# Patient Record
Sex: MALE | Race: WHITE | NOT HISPANIC OR LATINO | URBAN - METROPOLITAN AREA
[De-identification: names, ages, dates, MRNs, and addresses within clinical notes are randomized per-mention and may not be internally consistent; named-entity substitution may affect disease eponyms.]

---

## 2019-09-12 ENCOUNTER — INPATIENT (INPATIENT)
Facility: HOSPITAL | Age: 34
LOS: 1 days | Discharge: HOME | End: 2019-09-14
Attending: INTERNAL MEDICINE | Admitting: INTERNAL MEDICINE
Payer: COMMERCIAL

## 2019-09-12 VITALS
HEART RATE: 92 BPM | OXYGEN SATURATION: 100 % | RESPIRATION RATE: 20 BRPM | DIASTOLIC BLOOD PRESSURE: 110 MMHG | SYSTOLIC BLOOD PRESSURE: 155 MMHG

## 2019-09-12 LAB
ALBUMIN SERPL ELPH-MCNC: 4.2 G/DL — SIGNIFICANT CHANGE UP (ref 3.5–5.2)
ALBUMIN SERPL ELPH-MCNC: 4.4 G/DL — SIGNIFICANT CHANGE UP (ref 3.5–5.2)
ALP SERPL-CCNC: 128 U/L — HIGH (ref 30–115)
ALP SERPL-CCNC: 136 U/L — HIGH (ref 30–115)
ALT FLD-CCNC: 50 U/L — HIGH (ref 0–41)
ALT FLD-CCNC: 57 U/L — HIGH (ref 0–41)
ANION GAP SERPL CALC-SCNC: 13 MMOL/L — SIGNIFICANT CHANGE UP (ref 7–14)
ANION GAP SERPL CALC-SCNC: 15 MMOL/L — HIGH (ref 7–14)
AST SERPL-CCNC: 54 U/L — HIGH (ref 0–41)
AST SERPL-CCNC: 73 U/L — HIGH (ref 0–41)
BASOPHILS # BLD AUTO: 0.07 K/UL — SIGNIFICANT CHANGE UP (ref 0–0.2)
BASOPHILS NFR BLD AUTO: 0.9 % — SIGNIFICANT CHANGE UP (ref 0–1)
BILIRUB DIRECT SERPL-MCNC: <0.2 MG/DL — SIGNIFICANT CHANGE UP (ref 0–0.2)
BILIRUB INDIRECT FLD-MCNC: >0.4 MG/DL — SIGNIFICANT CHANGE UP (ref 0.2–1.2)
BILIRUB SERPL-MCNC: 0.6 MG/DL — SIGNIFICANT CHANGE UP (ref 0.2–1.2)
BILIRUB SERPL-MCNC: 0.9 MG/DL — SIGNIFICANT CHANGE UP (ref 0.2–1.2)
BLD GP AB SCN SERPL QL: SIGNIFICANT CHANGE UP
BUN SERPL-MCNC: 20 MG/DL — SIGNIFICANT CHANGE UP (ref 10–20)
BUN SERPL-MCNC: 21 MG/DL — HIGH (ref 10–20)
CALCIUM SERPL-MCNC: 8.2 MG/DL — LOW (ref 8.5–10.1)
CALCIUM SERPL-MCNC: 8.8 MG/DL — SIGNIFICANT CHANGE UP (ref 8.5–10.1)
CHLORIDE SERPL-SCNC: 101 MMOL/L — SIGNIFICANT CHANGE UP (ref 98–110)
CHLORIDE SERPL-SCNC: 104 MMOL/L — SIGNIFICANT CHANGE UP (ref 98–110)
CO2 SERPL-SCNC: 20 MMOL/L — SIGNIFICANT CHANGE UP (ref 17–32)
CO2 SERPL-SCNC: 25 MMOL/L — SIGNIFICANT CHANGE UP (ref 17–32)
CREAT SERPL-MCNC: 1.1 MG/DL — SIGNIFICANT CHANGE UP (ref 0.7–1.5)
CREAT SERPL-MCNC: 1.2 MG/DL — SIGNIFICANT CHANGE UP (ref 0.7–1.5)
EOSINOPHIL # BLD AUTO: 0.09 K/UL — SIGNIFICANT CHANGE UP (ref 0–0.7)
EOSINOPHIL NFR BLD AUTO: 1.2 % — SIGNIFICANT CHANGE UP (ref 0–8)
GLUCOSE SERPL-MCNC: 153 MG/DL — HIGH (ref 70–99)
GLUCOSE SERPL-MCNC: 98 MG/DL — SIGNIFICANT CHANGE UP (ref 70–99)
HCT VFR BLD CALC: 39 % — LOW (ref 42–52)
HGB BLD-MCNC: 13 G/DL — LOW (ref 14–18)
IMM GRANULOCYTES NFR BLD AUTO: 0.7 % — HIGH (ref 0.1–0.3)
LYMPHOCYTES # BLD AUTO: 1.5 K/UL — SIGNIFICANT CHANGE UP (ref 1.2–3.4)
LYMPHOCYTES # BLD AUTO: 20.3 % — LOW (ref 20.5–51.1)
MAGNESIUM SERPL-MCNC: 0.8 MG/DL — LOW (ref 1.8–2.4)
MAGNESIUM SERPL-MCNC: 1.7 MG/DL — LOW (ref 1.8–2.4)
MCHC RBC-ENTMCNC: 31.6 PG — HIGH (ref 27–31)
MCHC RBC-ENTMCNC: 33.3 G/DL — SIGNIFICANT CHANGE UP (ref 32–37)
MCV RBC AUTO: 94.9 FL — HIGH (ref 80–94)
MONOCYTES # BLD AUTO: 0.91 K/UL — HIGH (ref 0.1–0.6)
MONOCYTES NFR BLD AUTO: 12.3 % — HIGH (ref 1.7–9.3)
NEUTROPHILS # BLD AUTO: 4.78 K/UL — SIGNIFICANT CHANGE UP (ref 1.4–6.5)
NEUTROPHILS NFR BLD AUTO: 64.6 % — SIGNIFICANT CHANGE UP (ref 42.2–75.2)
NRBC # BLD: 0 /100 WBCS — SIGNIFICANT CHANGE UP (ref 0–0)
PHOSPHATE SERPL-MCNC: 4.6 MG/DL — SIGNIFICANT CHANGE UP (ref 2.1–4.9)
PLATELET # BLD AUTO: 315 K/UL — SIGNIFICANT CHANGE UP (ref 130–400)
POTASSIUM SERPL-MCNC: 4 MMOL/L — SIGNIFICANT CHANGE UP (ref 3.5–5)
POTASSIUM SERPL-MCNC: 4.7 MMOL/L — SIGNIFICANT CHANGE UP (ref 3.5–5)
POTASSIUM SERPL-SCNC: 4 MMOL/L — SIGNIFICANT CHANGE UP (ref 3.5–5)
POTASSIUM SERPL-SCNC: 4.7 MMOL/L — SIGNIFICANT CHANGE UP (ref 3.5–5)
PROT SERPL-MCNC: 6.9 G/DL — SIGNIFICANT CHANGE UP (ref 6–8)
PROT SERPL-MCNC: 6.9 G/DL — SIGNIFICANT CHANGE UP (ref 6–8)
RBC # BLD: 4.11 M/UL — LOW (ref 4.7–6.1)
RBC # FLD: 13.7 % — SIGNIFICANT CHANGE UP (ref 11.5–14.5)
SODIUM SERPL-SCNC: 139 MMOL/L — SIGNIFICANT CHANGE UP (ref 135–146)
SODIUM SERPL-SCNC: 139 MMOL/L — SIGNIFICANT CHANGE UP (ref 135–146)
WBC # BLD: 7.4 K/UL — SIGNIFICANT CHANGE UP (ref 4.8–10.8)
WBC # FLD AUTO: 7.4 K/UL — SIGNIFICANT CHANGE UP (ref 4.8–10.8)

## 2019-09-12 PROCEDURE — 99223 1ST HOSP IP/OBS HIGH 75: CPT

## 2019-09-12 PROCEDURE — 99285 EMERGENCY DEPT VISIT HI MDM: CPT

## 2019-09-12 PROCEDURE — 71045 X-RAY EXAM CHEST 1 VIEW: CPT | Mod: 26

## 2019-09-12 RX ORDER — MAGNESIUM SULFATE 500 MG/ML
2 VIAL (ML) INJECTION ONCE
Refills: 0 | Status: COMPLETED | OUTPATIENT
Start: 2019-09-12 | End: 2019-09-12

## 2019-09-12 RX ORDER — TRANEXAMIC ACID 100 MG/ML
1300 INJECTION, SOLUTION INTRAVENOUS ONCE
Refills: 0 | Status: DISCONTINUED | OUTPATIENT
Start: 2019-09-12 | End: 2019-09-12

## 2019-09-12 RX ORDER — SODIUM CHLORIDE 9 MG/ML
1000 INJECTION INTRAMUSCULAR; INTRAVENOUS; SUBCUTANEOUS ONCE
Refills: 0 | Status: COMPLETED | OUTPATIENT
Start: 2019-09-12 | End: 2019-09-12

## 2019-09-12 RX ORDER — MAGNESIUM SULFATE 500 MG/ML
4 VIAL (ML) INJECTION ONCE
Refills: 0 | Status: DISCONTINUED | OUTPATIENT
Start: 2019-09-12 | End: 2019-09-12

## 2019-09-12 RX ORDER — EPINEPHRINE 0.3 MG/.3ML
0.3 INJECTION INTRAMUSCULAR; SUBCUTANEOUS ONCE
Refills: 0 | Status: COMPLETED | OUTPATIENT
Start: 2019-09-12 | End: 2019-09-12

## 2019-09-12 RX ORDER — DIPHENHYDRAMINE HCL 50 MG
25 CAPSULE ORAL EVERY 6 HOURS
Refills: 0 | Status: DISCONTINUED | OUTPATIENT
Start: 2019-09-12 | End: 2019-09-14

## 2019-09-12 RX ORDER — TRANEXAMIC ACID 100 MG/ML
1000 INJECTION, SOLUTION INTRAVENOUS ONCE
Refills: 0 | Status: COMPLETED | OUTPATIENT
Start: 2019-09-12 | End: 2019-09-12

## 2019-09-12 RX ORDER — FAMOTIDINE 10 MG/ML
20 INJECTION INTRAVENOUS
Refills: 0 | Status: DISCONTINUED | OUTPATIENT
Start: 2019-09-12 | End: 2019-09-14

## 2019-09-12 RX ORDER — FAMOTIDINE 10 MG/ML
20 INJECTION INTRAVENOUS
Refills: 0 | Status: DISCONTINUED | OUTPATIENT
Start: 2019-09-12 | End: 2019-09-12

## 2019-09-12 RX ORDER — FAMOTIDINE 10 MG/ML
20 INJECTION INTRAVENOUS ONCE
Refills: 0 | Status: COMPLETED | OUTPATIENT
Start: 2019-09-12 | End: 2019-09-12

## 2019-09-12 RX ORDER — FOLIC ACID 0.8 MG
1 TABLET ORAL DAILY
Refills: 0 | Status: DISCONTINUED | OUTPATIENT
Start: 2019-09-12 | End: 2019-09-14

## 2019-09-12 RX ORDER — ENOXAPARIN SODIUM 100 MG/ML
40 INJECTION SUBCUTANEOUS DAILY
Refills: 0 | Status: DISCONTINUED | OUTPATIENT
Start: 2019-09-12 | End: 2019-09-14

## 2019-09-12 RX ORDER — SODIUM CHLORIDE 9 MG/ML
1000 INJECTION, SOLUTION INTRAVENOUS
Refills: 0 | Status: DISCONTINUED | OUTPATIENT
Start: 2019-09-12 | End: 2019-09-13

## 2019-09-12 RX ORDER — DIPHENHYDRAMINE HCL 50 MG
50 CAPSULE ORAL ONCE
Refills: 0 | Status: COMPLETED | OUTPATIENT
Start: 2019-09-12 | End: 2019-09-12

## 2019-09-12 RX ORDER — THIAMINE MONONITRATE (VIT B1) 100 MG
100 TABLET ORAL DAILY
Refills: 0 | Status: DISCONTINUED | OUTPATIENT
Start: 2019-09-12 | End: 2019-09-14

## 2019-09-12 RX ADMIN — TRANEXAMIC ACID 220 MILLIGRAM(S): 100 INJECTION, SOLUTION INTRAVENOUS at 06:43

## 2019-09-12 RX ADMIN — Medication 50 MILLIGRAM(S): at 06:00

## 2019-09-12 RX ADMIN — Medication 50 GRAM(S): at 08:07

## 2019-09-12 RX ADMIN — FAMOTIDINE 20 MILLIGRAM(S): 10 INJECTION INTRAVENOUS at 17:59

## 2019-09-12 RX ADMIN — EPINEPHRINE 0.3 MILLIGRAM(S): 0.3 INJECTION INTRAMUSCULAR; SUBCUTANEOUS at 06:00

## 2019-09-12 RX ADMIN — FAMOTIDINE 20 MILLIGRAM(S): 10 INJECTION INTRAVENOUS at 06:00

## 2019-09-12 RX ADMIN — SODIUM CHLORIDE 2000 MILLILITER(S): 9 INJECTION INTRAMUSCULAR; INTRAVENOUS; SUBCUTANEOUS at 06:00

## 2019-09-12 RX ADMIN — SODIUM CHLORIDE 125 MILLILITER(S): 9 INJECTION, SOLUTION INTRAVENOUS at 16:52

## 2019-09-12 RX ADMIN — Medication 2 MILLIGRAM(S): at 15:20

## 2019-09-12 RX ADMIN — Medication 25 MILLIGRAM(S): at 17:59

## 2019-09-12 RX ADMIN — Medication 50 GRAM(S): at 09:03

## 2019-09-12 RX ADMIN — SODIUM CHLORIDE 125 MILLILITER(S): 9 INJECTION, SOLUTION INTRAVENOUS at 22:43

## 2019-09-12 RX ADMIN — Medication 125 MILLIGRAM(S): at 06:00

## 2019-09-12 NOTE — ED PROVIDER NOTE - OBJECTIVE STATEMENT
34 y.o M w/ PMHx HTN on lisinopril p/w swelling of his mouth and lips that began overnight. Pt has no known allergies, and no hx of reaction or similar. Pt endorses a change in his voice. No family history of similar. Pt denies CP, no SOB, no N/V, no rash, no dizziness, no numbness or tingling.

## 2019-09-12 NOTE — H&P ADULT - NSHPREVIEWOFSYSTEMS_GEN_ALL_CORE
REVIEW OF SYSTEMS:    CONSTITUTIONAL: No weakness, fevers or chills  EYES/ENT: No visual changes;  "scratchy" sensation in throat has improved since last night   NECK: No pain or stiffness  RESPIRATORY: No cough, wheezing, hemoptysis; +shortness of breath this morning but resolved after treatment  CARDIOVASCULAR: No chest pain or palpitations  GASTROINTESTINAL: No abdominal or epigastric pain. No nausea, vomiting, or hematemesis; No diarrhea or constipation. No melena or hematochezia.  GENITOURINARY: No dysuria, frequency or hematuria  NEUROLOGICAL: No numbness or weakness

## 2019-09-12 NOTE — ED PROVIDER NOTE - PHYSICAL EXAMINATION
CONSTITUTIONAL: well-appearing, in NAD  SKIN: Warm dry, normal skin turgor, no rash.  HEAD: angioedema or mouth and lips.  EYES: PERRLA, no scleral icterus, conjunctiva pink  ENT: no erythema or exudates, edema of uvula, no swelling of mouth. Airway patent.   NECK: Supple; non tender. Full ROM.  CARD: RRR, no murmurs.  RESP: clear to ausculation b/l. No crackles or wheezing.  ABD: soft, non-tender, non-distended, no rebound or guarding.  EXT: no pedal edema, no calf tenderness  NEURO: normal motor. normal sensory. Normal gait.  PSYCH: Cooperative, appropriate.

## 2019-09-12 NOTE — H&P ADULT - HISTORY OF PRESENT ILLNESS
35 yo M with PMH of bladder cancer (treated, diagnosed at 3 year old), HTN on lisinopril, ADD     presents with swelling of his lips. First noticed at 10pm last night, first noticed and felt L side of upper lip was swollen, which then spread to both lips, but upper lip was more swollen.  Around 1am he felt "scratchy" sensation in the back of his throat.  By 5am was having difficulty breathing and then came to hospital.      In ER: initial vitals: T 98.2, HR92, /110, RR 20, pO2 100.  Was given epinephrine injection, IV solumedrol, benadryl, pepcid.  Magnesium was low, given supplementation.      In CCU during exam pt sitting up, no respiratory distress, speaking in full sentences.  Reports itching sensation in back of throat has improved, as well as swelling in lips.  L side of upper lip more swollen than right side. Pt denies headache, chest pain, SOB, nausea, rash, dizziness, numbness.    Pt denies any history of allergies or any similar previous reaction. No family history of allergies or similar reactions.   He started working on [employer] three months ago.  Does not recall eating any foods he has not tried before.  Has never had reaction, to nuts/seafood/any other food in past.  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

## 2019-09-12 NOTE — H&P ADULT - NSHPLABSRESULTS_GEN_ALL_CORE
Labs:                        13.0   7.40  )-----------( 315      ( 12 Sep 2019 06:40 )             39.0             09-12    139  |  101  |  21<H>  ----------------------------<  98  4.0   |  25  |  1.2    Ca    8.8      12 Sep 2019 06:40  Mg     0.8     09-12    TPro  6.9  /  Alb  4.4  /  TBili  0.9  /  DBili  x   /  AST  73<H>  /  ALT  57<H>  /  AlkPhos  136<H>  09-12    LIVER FUNCTIONS - ( 12 Sep 2019 06:40 )  Alb: 4.4 g/dL / Pro: 6.9 g/dL / ALK PHOS: 136 U/L / ALT: 57 U/L / AST: 73 U/L / GGT: x

## 2019-09-12 NOTE — ED PROVIDER NOTE - CLINICAL SUMMARY MEDICAL DECISION MAKING FREE TEXT BOX
Pt  pw  angioedema  lips  and pharynx  starting at 2am -  progressively worsening - no rash - no new foods,   on Lisinopril for years .'in ED   -  steroids  benadryl and epi ,  TXA 1 gm    4gm MG given  for hypomag - pt  with improvement of symptoms-  admitted to  ICU for continued observation

## 2019-09-12 NOTE — ED ADULT NURSE NOTE - NSIMPLEMENTINTERV_GEN_ALL_ED
Implemented All Universal Safety Interventions:  [place] to call system. Call bell, personal items and telephone within reach. Instruct patient to call for assistance. Room bathroom lighting operational. Non-slip footwear when patient is off stretcher. Physically safe environment: no spills, clutter or unnecessary equipment. Stretcher in lowest position, wheels locked, appropriate side rails in place.

## 2019-09-12 NOTE — H&P ADULT - NSHPSOCIALHISTORY_GEN_ALL_CORE
drinks 1 bottle of vodka most days, did not drink last 2 days because [place] sea  no smoking  denies illicit drug use    works on [employer], exposed to gasoline fumes

## 2019-09-12 NOTE — H&P ADULT - ASSESSMENT
35 yo M with PMH of bladder cancer (treated, diagnosed at 3 year old), HTN on lisinopril, ADD     lip and facial swelling - started last night, itching sensation started 3am today  difficulty breathing started at 5am  vitals all stable in ER    #angioedema   -likely secondary to ace-inhibitor use  -given epinephrine injection, IV solumedrol, benadryl, pepcid in ER  -continue IV steroids, benadryl, pepcid  -symptoms, swelling decreasing since this morning, will continue to monitor in CCU  -npo for now, will start IV fluids  -advised to stop taking ace-inhibitor    #alcohol intake  -drinks 1L vodka most days  -has not had drink in last 2 days due to being [place] sea  -feeling slightly agitated, will give IV 2mg ativan  -CORONAWA protocol  -thiamine and folic acid       npo for now  dvt ppx  full code

## 2019-09-12 NOTE — H&P ADULT - NSHPPHYSICALEXAM_GEN_ALL_CORE
PHYSICAL EXAM:  GENERAL: NAD, speaks in full sentences, no signs of respiratory distress  HEENT: angioedema: lips left > right, upper > lower lip.  Mild swelling of uvula, not deviated. EOMI, PERRLA, conjunctiva and sclera clear; Supple, No JVD.    CHEST/LUNG: Clear to auscultation bilaterally; No wheeze; No crackles; No accessory muscles used  HEART: Regular rate and rhythm; No murmurs;   ABDOMEN: Soft, Nontender, Nondistended; Bowel sounds present; No guarding.  Scar from bladder surgery.    EXTREMITIES:  2+ Peripheral Pulses, No cyanosis or edema  PSYCH: AAOx3  NEUROLOGY: non-focal

## 2019-09-13 LAB
ALBUMIN SERPL ELPH-MCNC: 4 G/DL — SIGNIFICANT CHANGE UP (ref 3.5–5.2)
ALP SERPL-CCNC: 118 U/L — HIGH (ref 30–115)
ALT FLD-CCNC: 40 U/L — SIGNIFICANT CHANGE UP (ref 0–41)
ANION GAP SERPL CALC-SCNC: 13 MMOL/L — SIGNIFICANT CHANGE UP (ref 7–14)
ANION GAP SERPL CALC-SCNC: 14 MMOL/L — SIGNIFICANT CHANGE UP (ref 7–14)
AST SERPL-CCNC: 39 U/L — SIGNIFICANT CHANGE UP (ref 0–41)
BILIRUB SERPL-MCNC: 0.5 MG/DL — SIGNIFICANT CHANGE UP (ref 0.2–1.2)
BUN SERPL-MCNC: 20 MG/DL — SIGNIFICANT CHANGE UP (ref 10–20)
BUN SERPL-MCNC: 21 MG/DL — HIGH (ref 10–20)
CALCIUM SERPL-MCNC: 8 MG/DL — LOW (ref 8.5–10.1)
CALCIUM SERPL-MCNC: 8.2 MG/DL — LOW (ref 8.5–10.1)
CHLORIDE SERPL-SCNC: 104 MMOL/L — SIGNIFICANT CHANGE UP (ref 98–110)
CHLORIDE SERPL-SCNC: 106 MMOL/L — SIGNIFICANT CHANGE UP (ref 98–110)
CO2 SERPL-SCNC: 25 MMOL/L — SIGNIFICANT CHANGE UP (ref 17–32)
CO2 SERPL-SCNC: 27 MMOL/L — SIGNIFICANT CHANGE UP (ref 17–32)
CREAT SERPL-MCNC: 1 MG/DL — SIGNIFICANT CHANGE UP (ref 0.7–1.5)
CREAT SERPL-MCNC: 1.1 MG/DL — SIGNIFICANT CHANGE UP (ref 0.7–1.5)
GLUCOSE SERPL-MCNC: 110 MG/DL — HIGH (ref 70–99)
GLUCOSE SERPL-MCNC: 110 MG/DL — HIGH (ref 70–99)
HCT VFR BLD CALC: 37.8 % — LOW (ref 42–52)
HGB BLD-MCNC: 12.6 G/DL — LOW (ref 14–18)
MAGNESIUM SERPL-MCNC: 1.6 MG/DL — LOW (ref 1.8–2.4)
MCHC RBC-ENTMCNC: 31.6 PG — HIGH (ref 27–31)
MCHC RBC-ENTMCNC: 33.3 G/DL — SIGNIFICANT CHANGE UP (ref 32–37)
MCV RBC AUTO: 94.7 FL — HIGH (ref 80–94)
NRBC # BLD: 0 /100 WBCS — SIGNIFICANT CHANGE UP (ref 0–0)
PHOSPHATE SERPL-MCNC: 2.9 MG/DL — SIGNIFICANT CHANGE UP (ref 2.1–4.9)
PLATELET # BLD AUTO: 290 K/UL — SIGNIFICANT CHANGE UP (ref 130–400)
POTASSIUM SERPL-MCNC: 3.8 MMOL/L — SIGNIFICANT CHANGE UP (ref 3.5–5)
POTASSIUM SERPL-MCNC: 4.6 MMOL/L — SIGNIFICANT CHANGE UP (ref 3.5–5)
POTASSIUM SERPL-SCNC: 3.8 MMOL/L — SIGNIFICANT CHANGE UP (ref 3.5–5)
POTASSIUM SERPL-SCNC: 4.6 MMOL/L — SIGNIFICANT CHANGE UP (ref 3.5–5)
PROT SERPL-MCNC: 6.5 G/DL — SIGNIFICANT CHANGE UP (ref 6–8)
RBC # BLD: 3.99 M/UL — LOW (ref 4.7–6.1)
RBC # FLD: 13.4 % — SIGNIFICANT CHANGE UP (ref 11.5–14.5)
SODIUM SERPL-SCNC: 142 MMOL/L — SIGNIFICANT CHANGE UP (ref 135–146)
SODIUM SERPL-SCNC: 147 MMOL/L — HIGH (ref 135–146)
WBC # BLD: 10 K/UL — SIGNIFICANT CHANGE UP (ref 4.8–10.8)
WBC # FLD AUTO: 10 K/UL — SIGNIFICANT CHANGE UP (ref 4.8–10.8)

## 2019-09-13 PROCEDURE — 99233 SBSQ HOSP IP/OBS HIGH 50: CPT

## 2019-09-13 RX ORDER — METOPROLOL TARTRATE 50 MG
25 TABLET ORAL
Refills: 0 | Status: DISCONTINUED | OUTPATIENT
Start: 2019-09-13 | End: 2019-09-14

## 2019-09-13 RX ORDER — AMLODIPINE BESYLATE 2.5 MG/1
5 TABLET ORAL DAILY
Refills: 0 | Status: DISCONTINUED | OUTPATIENT
Start: 2019-09-13 | End: 2019-09-14

## 2019-09-13 RX ORDER — MAGNESIUM SULFATE 500 MG/ML
2 VIAL (ML) INJECTION ONCE
Refills: 0 | Status: COMPLETED | OUTPATIENT
Start: 2019-09-13 | End: 2019-09-13

## 2019-09-13 RX ADMIN — Medication 2 MILLIGRAM(S): at 22:48

## 2019-09-13 RX ADMIN — Medication 1 MILLIGRAM(S): at 14:19

## 2019-09-13 RX ADMIN — AMLODIPINE BESYLATE 5 MILLIGRAM(S): 2.5 TABLET ORAL at 12:04

## 2019-09-13 RX ADMIN — Medication 25 MILLIGRAM(S): at 00:51

## 2019-09-13 RX ADMIN — Medication 100 MILLIGRAM(S): at 11:14

## 2019-09-13 RX ADMIN — Medication 50 GRAM(S): at 14:20

## 2019-09-13 RX ADMIN — ENOXAPARIN SODIUM 40 MILLIGRAM(S): 100 INJECTION SUBCUTANEOUS at 11:13

## 2019-09-13 RX ADMIN — Medication 25 MILLIGRAM(S): at 05:56

## 2019-09-13 RX ADMIN — Medication 25 MILLIGRAM(S): at 17:41

## 2019-09-13 RX ADMIN — Medication 60 MILLIGRAM(S): at 05:55

## 2019-09-13 RX ADMIN — FAMOTIDINE 20 MILLIGRAM(S): 10 INJECTION INTRAVENOUS at 17:41

## 2019-09-13 RX ADMIN — FAMOTIDINE 20 MILLIGRAM(S): 10 INJECTION INTRAVENOUS at 05:55

## 2019-09-13 RX ADMIN — Medication 25 MILLIGRAM(S): at 22:22

## 2019-09-13 RX ADMIN — Medication 25 MILLIGRAM(S): at 11:14

## 2019-09-13 NOTE — CHART NOTE - NSCHARTNOTEFT_GEN_A_CORE
PT with elevated BP (SBPs > 180). On Amlodipine. No symptoms. Hx of angioedema. Will hold starting an ACE-I. Start on Metoprolol succinate 25 mg BID

## 2019-09-13 NOTE — CHART NOTE - NSCHARTNOTEFT_GEN_A_CORE
Patient seen at bedside - pleasant, resting comfortably.    Pushed IV benadryl. Patient states he is feeling better - w/o complaints.     Patient without questions or concerns at this time.     Patient encouraged to contact PA with any further questions or concerns.

## 2019-09-13 NOTE — PROGRESS NOTE ADULT - ASSESSMENT
33 yo M with PMH of bladder cancer (treated, diagnosed at 3 year old), HTN on lisinopril, ADD     lip and facial swelling associated with difficulty breathing       #angioedema   -likely secondary to ace-inhibitor use  -given epinephrine injection, IV solumedrol, benadryl, pepcid in ER  -continue IV steroids, benadryl, pepcid  -symptoms, swelling decreasing since this morning, can downgrade to medical floor  -start DASH diet  -advised to stop taking ace-inhibitor    #HTN  -will start amlodipine for HTN    #alcohol intake  -drinks 1L vodka most days  -has not had drink in last 2 days due to [place]  -[facility] protocol  -thiamine and folic acid       DASH diet    dvt ppx  full code

## 2019-09-13 NOTE — CONSULT NOTE ADULT - SUBJECTIVE AND OBJECTIVE BOX
Patient is a 34y old  Male who presents with a chief complaint of lip/facial swelling (12 Sep 2019 13:10)      HPI:  33 yo M with PMH of bladder cancer (treated, diagnosed at 3 year old), HTN on lisinopril, ADD     presents with swelling of his lips. First noticed at 10pm last night, first noticed and felt L side of upper lip was swollen, which then spread to both lips, but upper lip was more swollen.  Around 1am he felt "scratchy" sensation in the back of his throat.  By 5am was having difficulty breathing and then came to hospital.      In ER: initial vitals: T 98.2, HR92, /110, RR 20, pO2 100.  Was given epinephrine injection, IV solumedrol, benadryl, pepcid.  Magnesium was low, given supplementation.      In CCU during exam pt sitting up, no respiratory distress, speaking in full sentences.  Reports itching sensation in back of throat has improved, as well as swelling in lips.  L side of upper lip more swollen than right side. Pt denies headache, chest pain, SOB, nausea, rash, dizziness, numbness.    Pt denies any history of allergies or any similar previous reaction. No family history of allergies or similar reactions.   He started working on [employer] three months ago.  Does not recall eating any foods he has not tried before.  Has never had reaction, to nuts/seafood/any other food in past.  [employer] gasoline and is exposed to fumes. (12 Sep 2019 13:10)  today feel much better not on distress swelling decrease     PAST MEDICAL & SURGICAL HISTORY:  Bladder cancer: diagnosed, treated at 3 years old  Hypertension  No significant past surgical history    Allergies    No Known Allergies    Intolerances      Family history : no cardiovscular family history   Home Medications:  Adderall:  (12 Sep 2019 13:25)  lisinopril:  (12 Sep 2019 13:25)    Occupation:  Alochol: Denied  Smoking: Denied  Drug Use: Denied  Marital Status:         ROS: as in HPI; All other systems reviewed are negative    ICU Vital Signs Last 24 Hrs  T(C): 36.7 (13 Sep 2019 07:10), Max: 36.8 (12 Sep 2019 11:06)  T(F): 98.1 (13 Sep 2019 07:10), Max: 98.3 (12 Sep 2019 11:06)  HR: 77 (13 Sep 2019 07:31) (57 - 81)  BP: 160/105 (13 Sep 2019 07:31) (127/84 - 175/110)  BP(mean): 127 (13 Sep 2019 07:31) (101 - 127)  ABP: --  ABP(mean): --  RR: 20 (13 Sep 2019 07:10) (18 - 22)  SpO2: 100% (13 Sep 2019 07:31) (97% - 100%)        Physical Examination:    General: No acute distress.  Alert, oriented, interactive, nonfocal    HEENT: swelling lips mild, mild cheek swelling left     PULM: Clear to auscultation bilaterally, no significant sputum production    CVS: Regular rate and rhythm, no murmurs, rubs, or gallops    ABD: Soft, nondistended, nontender, normoactive bowel sounds, no masses    EXT: No edema, nontender, no clubbing     SKIN: Warm and well perfused, no rashes noted.    Neurology : no motor or sensory deficit     Musculoskeletal : move all extremity     Lymphatic system: no Palpable node               I&O's Detail    12 Sep 2019 07:01  -  13 Sep 2019 07:00  --------------------------------------------------------  IN:    dextrose 5% + sodium chloride 0.9%.: 2125 mL    Oral Fluid: 100 mL  Total IN: 2225 mL    OUT:    Voided: 1500 mL  Total OUT: 1500 mL    Total NET: 725 mL      13 Sep 2019 07:01  -  13 Sep 2019 09:44  --------------------------------------------------------  IN:    dextrose 5% + sodium chloride 0.9%.: 500 mL  Total IN: 500 mL    OUT:  Total OUT: 0 mL    Total NET: 500 mL            LABS:                        12.6   10.00 )-----------( 290      ( 13 Sep 2019 06:10 )             37.8     13 Sep 2019 06:10    147    |  106    |  21     ----------------------------<  110    4.6     |  27     |  1.1      Ca    8.2        13 Sep 2019 06:10  Phos  2.9       13 Sep 2019 06:00  Mg     1.6       13 Sep 2019 06:00    TPro  6.5    /  Alb  4.0    /  TBili  0.5    /  DBili  x      /  AST  39     /  ALT  40     /  AlkPhos  118    13 Sep 2019 06:10  Amylase x     lipase x              CAPILLARY BLOOD GLUCOSE            Culture        MEDICATIONS  (STANDING):  dextrose 5% + sodium chloride 0.9%. 1000 milliLiter(s) (125 mL/Hr) IV Continuous <Continuous>  diphenhydrAMINE   Injectable 25 milliGRAM(s) IV Push every 6 hours  enoxaparin Injectable 40 milliGRAM(s) SubCutaneous daily  famotidine Injectable 20 milliGRAM(s) IV Push two times a day  folic acid Injectable 1 milliGRAM(s) IV Push daily  methylPREDNISolone sodium succinate Injectable 60 milliGRAM(s) IV Push daily  thiamine Injectable 100 milliGRAM(s) IV Push daily    MEDICATIONS  (PRN):  LORazepam   Injectable 2 milliGRAM(s) IV Push every 2 hours PRN CIWA-Ar score increase by 2 points and a total score of 7 or less        RADIOLOGY: ***     CXR: no infiltrate   TLC:  OG:  ET tube:        CAM ICU:  ECHO:

## 2019-09-13 NOTE — PROGRESS NOTE ADULT - ASSESSMENT
35 yo M with PMH of bladder cancer (treated, diagnosed at 3 year old), HTN on lisinopril, ADD       #angioedema / allergic reaction / HTN  - Lisinopril DC'd  -given epinephrine injection x 2 in ED   - improving on IV solumedrol, benadryl, Pepcid   -continue IV steroids, benadryl, Pepcid  -Norvasc started for HTN  -may start oral diet today    #alcohol abuse / hypomagnesemia  -supplement magnesium and re check lytes in am  -CIWA protocol  -thiamine and folic acid       dvt ppx  full code

## 2019-09-14 VITALS
DIASTOLIC BLOOD PRESSURE: 102 MMHG | HEART RATE: 62 BPM | TEMPERATURE: 98 F | RESPIRATION RATE: 18 BRPM | SYSTOLIC BLOOD PRESSURE: 158 MMHG

## 2019-09-14 LAB
ALBUMIN SERPL ELPH-MCNC: 4.3 G/DL — SIGNIFICANT CHANGE UP (ref 3.5–5.2)
ALP SERPL-CCNC: 131 U/L — HIGH (ref 30–115)
ALT FLD-CCNC: 47 U/L — HIGH (ref 0–41)
ANION GAP SERPL CALC-SCNC: 10 MMOL/L — SIGNIFICANT CHANGE UP (ref 7–14)
AST SERPL-CCNC: 51 U/L — HIGH (ref 0–41)
BILIRUB SERPL-MCNC: 0.4 MG/DL — SIGNIFICANT CHANGE UP (ref 0.2–1.2)
BUN SERPL-MCNC: 21 MG/DL — HIGH (ref 10–20)
CALCIUM SERPL-MCNC: 8.8 MG/DL — SIGNIFICANT CHANGE UP (ref 8.5–10.1)
CHLORIDE SERPL-SCNC: 103 MMOL/L — SIGNIFICANT CHANGE UP (ref 98–110)
CO2 SERPL-SCNC: 29 MMOL/L — SIGNIFICANT CHANGE UP (ref 17–32)
CREAT SERPL-MCNC: 1 MG/DL — SIGNIFICANT CHANGE UP (ref 0.7–1.5)
GLUCOSE SERPL-MCNC: 105 MG/DL — HIGH (ref 70–99)
HCT VFR BLD CALC: 38.5 % — LOW (ref 42–52)
HGB BLD-MCNC: 13 G/DL — LOW (ref 14–18)
MCHC RBC-ENTMCNC: 31.6 PG — HIGH (ref 27–31)
MCHC RBC-ENTMCNC: 33.8 G/DL — SIGNIFICANT CHANGE UP (ref 32–37)
MCV RBC AUTO: 93.4 FL — SIGNIFICANT CHANGE UP (ref 80–94)
NRBC # BLD: 0 /100 WBCS — SIGNIFICANT CHANGE UP (ref 0–0)
PLATELET # BLD AUTO: 290 K/UL — SIGNIFICANT CHANGE UP (ref 130–400)
POTASSIUM SERPL-MCNC: 4.5 MMOL/L — SIGNIFICANT CHANGE UP (ref 3.5–5)
POTASSIUM SERPL-SCNC: 4.5 MMOL/L — SIGNIFICANT CHANGE UP (ref 3.5–5)
PROT SERPL-MCNC: 6.7 G/DL — SIGNIFICANT CHANGE UP (ref 6–8)
RBC # BLD: 4.12 M/UL — LOW (ref 4.7–6.1)
RBC # FLD: 13.2 % — SIGNIFICANT CHANGE UP (ref 11.5–14.5)
SODIUM SERPL-SCNC: 142 MMOL/L — SIGNIFICANT CHANGE UP (ref 135–146)
WBC # BLD: 7.82 K/UL — SIGNIFICANT CHANGE UP (ref 4.8–10.8)
WBC # FLD AUTO: 7.82 K/UL — SIGNIFICANT CHANGE UP (ref 4.8–10.8)

## 2019-09-14 PROCEDURE — 99239 HOSP IP/OBS DSCHRG MGMT >30: CPT

## 2019-09-14 RX ORDER — AMLODIPINE BESYLATE 2.5 MG/1
1 TABLET ORAL
Qty: 30 | Refills: 0
Start: 2019-09-14 | End: 2019-10-13

## 2019-09-14 RX ORDER — CARVEDILOL PHOSPHATE 80 MG/1
1 CAPSULE, EXTENDED RELEASE ORAL
Qty: 60 | Refills: 0
Start: 2019-09-14 | End: 2019-10-13

## 2019-09-14 RX ORDER — DEXTROAMPHETAMINE SACCHARATE, AMPHETAMINE ASPARTATE, DEXTROAMPHETAMINE SULFATE AND AMPHETAMINE SULFATE 1.875; 1.875; 1.875; 1.875 MG/1; MG/1; MG/1; MG/1
1 TABLET ORAL
Qty: 60 | Refills: 0
Start: 2019-09-14 | End: 2019-10-13

## 2019-09-14 RX ORDER — LISINOPRIL 2.5 MG/1
0 TABLET ORAL
Qty: 0 | Refills: 0 | DISCHARGE

## 2019-09-14 RX ORDER — DEXTROAMPHETAMINE SACCHARATE, AMPHETAMINE ASPARTATE, DEXTROAMPHETAMINE SULFATE AND AMPHETAMINE SULFATE 1.875; 1.875; 1.875; 1.875 MG/1; MG/1; MG/1; MG/1
0 TABLET ORAL
Qty: 0 | Refills: 0 | DISCHARGE

## 2019-09-14 RX ADMIN — FAMOTIDINE 20 MILLIGRAM(S): 10 INJECTION INTRAVENOUS at 06:02

## 2019-09-14 RX ADMIN — Medication 25 MILLIGRAM(S): at 06:02

## 2019-09-14 RX ADMIN — Medication 25 MILLIGRAM(S): at 00:05

## 2019-09-14 RX ADMIN — Medication 60 MILLIGRAM(S): at 06:02

## 2019-09-14 RX ADMIN — AMLODIPINE BESYLATE 5 MILLIGRAM(S): 2.5 TABLET ORAL at 06:02

## 2019-09-14 RX ADMIN — Medication 25 MILLIGRAM(S): at 06:01

## 2019-09-14 NOTE — PROGRESS NOTE ADULT - SUBJECTIVE AND OBJECTIVE BOX
Patient is a 34y old  Male who presents with a chief complaint of lip/facial swelling (13 Sep 2019 11:36)      PAST MEDICAL & SURGICAL HISTORY:  Bladder cancer: diagnosed, treated at 3 years old  Hypertension  No significant past surgical history      MEDICATIONS  (STANDING):  amLODIPine   Tablet 5 milliGRAM(s) Oral daily  diphenhydrAMINE   Injectable 25 milliGRAM(s) IV Push every 6 hours  enoxaparin Injectable 40 milliGRAM(s) SubCutaneous daily  famotidine Injectable 20 milliGRAM(s) IV Push two times a day  folic acid Injectable 1 milliGRAM(s) IV Push daily  methylPREDNISolone sodium succinate Injectable 60 milliGRAM(s) IV Push daily  thiamine Injectable 100 milliGRAM(s) IV Push daily    MEDICATIONS  (PRN):  LORazepam   Injectable 2 milliGRAM(s) IV Push every 2 hours PRN CIWA-Ar score increase by 2 points and a total score of 7 or less      Overnight events:    Vital Signs Last 24 Hrs  T(C): 37.1 (13 Sep 2019 11:00), Max: 37.1 (13 Sep 2019 11:00)  T(F): 98.8 (13 Sep 2019 11:00), Max: 98.8 (13 Sep 2019 11:00)  HR: 77 (13 Sep 2019 12:58) (57 - 119)  BP: 137/85 (13 Sep 2019 12:58) (127/84 - 168/104)  BP(mean): 106 (13 Sep 2019 12:58) (101 - 131)  RR: 20 (13 Sep 2019 11:00) (18 - 22)  SpO2: 99% (13 Sep 2019 12:58) (97% - 100%)  CAPILLARY BLOOD GLUCOSE        I&O's Summary    12 Sep 2019 07:01  -  13 Sep 2019 07:00  --------------------------------------------------------  IN: 2225 mL / OUT: 1500 mL / NET: 725 mL    13 Sep 2019 07:01  -  13 Sep 2019 14:51  --------------------------------------------------------  IN: 875 mL / OUT: 380 mL / NET: 495 mL        Physical Exam:    GENERAL: NAD, speaks in full sentences, no signs of respiratory distress  HEENT: angioedema: left upper lip and left cheek still swollen, right upper lip and all of lower lip improved.  No uvula swelling.  EOMI, PERRLA, conjunctiva and sclera clear; Supple, No JVD.    CHEST/LUNG: Clear to auscultation bilaterally; No wheeze; No crackles; No accessory muscles used  HEART: Regular rate and rhythm; No murmurs;   ABDOMEN: Soft, Nontender, Nondistended; Bowel sounds present; No guarding.  Scar from bladder surgery.   EXTREMITIES:  2+ Peripheral Pulses, No cyanosis or edema  PSYCH: AAOx3  NEUROLOGY: non-focal      Labs:                        12.6   10.00 )-----------( 290      ( 13 Sep 2019 06:10 )             37.8             09-13    147<H>  |  106  |  21<H>  ----------------------------<  110<H>  4.6   |  27  |  1.1    Ca    8.2<L>      13 Sep 2019 06:10  Phos  2.9     09-13  Mg     1.6     09-13    TPro  6.5  /  Alb  4.0  /  TBili  0.5  /  DBili  x   /  AST  39  /  ALT  40  /  AlkPhos  118<H>  09-13    LIVER FUNCTIONS - ( 13 Sep 2019 06:10 )  Alb: 4.0 g/dL / Pro: 6.5 g/dL / ALK PHOS: 118 U/L / ALT: 40 U/L / AST: 39 U/L / GGT: x
AMANDA GOMEZ  34y  Male    Patient is a 34y old  Male who presents with a chief complaint of lip/facial swelling (13 Sep 2019 14:51)      INTERVAL HPI/OVERNIGHT EVENTS:  No interval events.  Patient has no new complaints.  Facial swelling resolved.  No respiratory distress or dysphagia.      REVIEW OF SYSTEMS:  At least 10 systems were reviewed in ROS.   All systems reviewed are within normal limits.      VITALS:  T(F): 97.7 (09-14-19 @ 04:52), Max: 98.8 (09-13-19 @ 11:00)  HR: 62 (09-14-19 @ 04:52) (62 - 119)  BP: 158/102 (09-14-19 @ 04:52) (137/85 - 182/111)  RR: 18 (09-14-19 @ 04:52) (18 - 20)  SpO2: 99% (09-13-19 @ 12:58) (99% - 100%)      PHYSICAL EXAM:  GENERAL: NAD, well-developed  HEAD:  Atraumatic, Normocephalic  EYES: conjunctiva and sclera clear  ENMT: Moist mucous membranes  NECK: Supple, Normal thyroid  NERVOUS SYSTEM:  Alert & Oriented X 3, Good concentration; Motor Strength 5/5 B/L upper and lower extremities  CHEST/LUNG: Clear to auscultation bilaterally; No rales, rhonchi, wheezing, or rubs  HEART: Regular rate and rhythm; No murmurs, rubs, or gallops  ABDOMEN: Soft, Nontender, Nondistended; Bowel sounds present  EXTREMITIES:  2+ Peripheral Pulses, No clubbing, cyanosis, or edema  LYMPH: No lymphadenopathy noted  SKIN: No rashes or lesions    Consultant(s) Notes Reviewed:  [x ] YES  [ ] NO  Care Discussed with Consultants/Other Providers [ x] YES  [ ] NO    LABS:                        13.0   7.82  )-----------( 290      ( 14 Sep 2019 08:00 )             38.5     09-13    147<H>  |  106  |  21<H>  ----------------------------<  110<H>  4.6   |  27  |  1.1    Ca    8.2<L>      13 Sep 2019 06:10  Phos  2.9     09-13  Mg     1.6     09-13    TPro  6.5  /  Alb  4.0  /  TBili  0.5  /  DBili  x   /  AST  39  /  ALT  40  /  AlkPhos  118<H>  09-13      MICROBIOLOGY: None      RADIOLOGY & ADDITIONAL TESTS:  Xray Chest 1 View-PORTABLE IMMEDIATE (09.12.19 @ 07:24)   No radiographic evidence of acute cardiopulmonary disease.      Imaging Personally Reviewed:  [x] YES  [ ] NO    MEDICATIONS  (STANDING):  amLODIPine   Tablet 5 milliGRAM(s) Oral daily  diphenhydrAMINE   Injectable 25 milliGRAM(s) IV Push every 6 hours  enoxaparin Injectable 40 milliGRAM(s) SubCutaneous daily  famotidine Injectable 20 milliGRAM(s) IV Push two times a day  folic acid Injectable 1 milliGRAM(s) IV Push daily  methylPREDNISolone sodium succinate Injectable 60 milliGRAM(s) IV Push daily  metoprolol tartrate 25 milliGRAM(s) Oral two times a day  thiamine Injectable 100 milliGRAM(s) IV Push daily      MEDICATIONS  (PRN):  LORazepam   Injectable 2 milliGRAM(s) IV Push every 2 hours PRN CIWA-Ar score increase by 2 points and a total score of 7 or less      Home Medications:  Adderall:  (12 Sep 2019 13:25)  lisinopril:  (12 Sep 2019 13:25)    HEALTH ISSUES - PROBLEM Dx:  Bladder cancer: diagnosed, treated at 3 years old  Hypertension  Angioedema
Patient still with left sided facial swelling, but improved, no more throat discomfort       T(F): 98.8 (09-13-19 @ 11:00), Max: 98.8 (09-13-19 @ 11:00)  HR: 91 (09-13-19 @ 09:31)  BP: 167/106 (09-13-19 @ 09:31)  RR: 20 (09-13-19 @ 11:00)  SpO2: 99% (09-13-19 @ 09:31) (97% - 100%)    PHYSICAL EXAM:  GENERAL: NAD  HEAD: Lips and Left side of face still swollen, non tender   ENMT: No uvula enlargement; Moist mucous membranes  NECK: Supple, No JVD, Normal thyroid  NERVOUS SYSTEM:  Alert & Oriented X3, no focal deficit  CHEST/LUNG: Clear to percussion bilaterally; No rales, rhonchi, wheezing, or rubs  HEART: Regular rate and rhythm; No murmurs, rubs, or gallops  ABDOMEN: Soft, Nontender, Nondistended; Bowel sounds present  EXTREMITIES:  2+ Peripheral Pulses, No clubbing, cyanosis, or edema  LYMPH: No lymphadenopathy noted  SKIN: No rashes or lesions    LABS  09-13    147<H>  |  106  |  21<H>  ----------------------------<  110<H>  4.6   |  27  |  1.1    Ca    8.2<L>      13 Sep 2019 06:10  Phos  2.9     09-13  Mg     1.6     09-13    TPro  6.5  /  Alb  4.0  /  TBili  0.5  /  DBili  x   /  AST  39  /  ALT  40  /  AlkPhos  118<H>  09-13                          12.6   10.00 )-----------( 290      ( 13 Sep 2019 06:10 )             37.8     MEDICATIONS  (STANDING):  dextrose 5% + sodium chloride 0.9%. 1000 milliLiter(s) (125 mL/Hr) IV Continuous <Continuous>  diphenhydrAMINE   Injectable 25 milliGRAM(s) IV Push every 6 hours  enoxaparin Injectable 40 milliGRAM(s) SubCutaneous daily  famotidine Injectable 20 milliGRAM(s) IV Push two times a day  folic acid Injectable 1 milliGRAM(s) IV Push daily  methylPREDNISolone sodium succinate Injectable 60 milliGRAM(s) IV Push daily  thiamine Injectable 100 milliGRAM(s) IV Push daily    MEDICATIONS  (PRN):  LORazepam   Injectable 2 milliGRAM(s) IV Push every 2 hours PRN CIWA-Ar score increase by 2 points and a total score of 7 or less

## 2019-09-14 NOTE — DISCHARGE NOTE PROVIDER - NSDCCPCAREPLAN_GEN_ALL_CORE_FT
PRINCIPAL DISCHARGE DIAGNOSIS  Diagnosis: Angioedema  Assessment and Plan of Treatment: Resolved. Secondary to ace-inhibitor use.  Advised to stop taking ace-inhibitor.  Lisinopril discontinued.      SECONDARY DISCHARGE DIAGNOSES  Diagnosis: Hypertension  Assessment and Plan of Treatment: Started on Amlodipine & Coreg.  Follow up out patient with PMD for BP monitoring and Medication adjustment.

## 2019-09-14 NOTE — PROGRESS NOTE ADULT - ASSESSMENT
33 yo M with PMH of bladder cancer (treated, diagnosed at 3 year old), HTN on lisinopril, ADHD, presented with swelling of his lips & face. First noticed the night prior to presentation, initially on the L side of upper lip but progressively spread to both lips. By the morning the swelling had spread to the whole face with difficulty breathing thus came in to the ED for evaluation. He denied any history of allergies or any similar previous reaction. No family history of allergies or similar reactions. He has been for Lisinopril for ~5 years with mild tongue swelling in the past.   In ER: initial vitals: T 98.2, HR92, /110, RR 20, pO2 100.  Was given epinephrine injection, IV solumedrol, benadryl, Pepcid and admitted to the CCU for further stabilization.     Assessment & Plan:    1. Angioedema:      #angioedema   -likely secondary to ace-inhibitor use  -given epinephrine injection, IV solumedrol, benadryl, pepcid in ER  -continue IV steroids, benadryl, pepcid  -symptoms, swelling decreasing since this morning, can downgrade to medical floor  -start DASH diet  -advised to stop taking ace-inhibitor    #HTN  -will start amlodipine for HTN    #alcohol intake  -drinks 1L vodka most days  -has not had drink in last 2 days due to being as sea  -CIWA protocol  -thiamine and folic acid       lip and facial swelling - started last night, itching sensation started 3am today  difficulty breathing started at 5am  vitals all stable in ER    #angioedema   -likely secondary to ace-inhibitor use  -given epinephrine injection, IV solumedrol, benadryl, pepcid in ER  -continue IV steroids, benadryl, pepcid  -symptoms, swelling decreasing since this morning, will continue to monitor in CCU  -npo for now, will start IV fluids  -advised to stop taking ace-inhibitor    #alcohol intake  -drinks 1L vodka most days  -has not had drink in last 2 days due to being as sea  -feeling slightly agitated, will give IV 2mg ativan  -CIWA protocol  -thiamine and folic acid     #angioedema / allergic reaction / HTN  - Lisinopril DC'd  -given epinephrine injection x 2 in ED   - improving on IV solumedrol, benadryl, Pepcid   -continue IV steroids, benadryl, Pepcid  -Norvasc started for HTN  -may start oral diet today    #alcohol abuse / hypomagnesemia  -supplement magnesium and re check lytes in am  -CIWA protocol  -thiamine and folic acid   Prophylaxis:  Code status:    Progress Note Handoff:  Pending consults:  Pending Tests:  Significant Test results:  Other issues:  Family/Patient discussion:  Disposition:      Attending: Dr. Angie Suh. Spectra 1503. 35 yo M with PMH of bladder cancer (treated, diagnosed at 3 year old), HTN on lisinopril, ADHD, presented with swelling of his lips & face. First noticed the night prior to presentation, initially on the L side of upper lip but progressively spread to both lips. By the morning the swelling had spread to the whole face with difficulty breathing thus came in to the ED for evaluation. He denied any history of allergies or any similar previous reaction. No family history of allergies or similar reactions. He has been for Lisinopril for ~5 years with mild tongue swelling in the past.   In ER: initial vitals: T 98.2, HR92, /110, RR 20, pO2 100.  Was given epinephrine injection, IV solumedrol, benadryl, Pepcid and admitted to the CCU for further stabilization.     Assessment & Plan:    1. Angioedema: Resolved.  Likely secondary to ace-inhibitor use.  Advised to stop taking ace-inhibitor.  Lisinopril discontinued.      2. HTN:  Started on Amlodipine.  Follow up out patient with PMD.      3. Alcohol use disorder:  No evidence of withdrawal.  Counselling provided.      4. Suspected Thiamine and folate deficiency:  Continued supplements.         5. Magnesium Deficiency:  Replaced.          Prophylaxis: None  Code status: Full code    Progress Note Handoff:  Pending consults: None  Pending Tests: None  Significant Test results: None  Family/Patient discussion: Plan of care discussed with patient.  Disposition: Home.      Attending: Dr. Angie Suh. Spectra 1503.

## 2019-09-14 NOTE — DISCHARGE NOTE NURSING/CASE MANAGEMENT/SOCIAL WORK - PATIENT PORTAL LINK FT
You can access the FollowMyHealth Patient Portal offered by [facility] by registering at the following website: [email or web address]. By joining [employer]’s FollowMyHealth portal, you will also be able to view your health information using other applications (apps) compatible with our system.

## 2019-09-14 NOTE — DISCHARGE NOTE PROVIDER - HOSPITAL COURSE
35 yo M with PMH of bladder cancer (treated, diagnosed at 3 year old), HTN on lisinopril, ADHD, presented with swelling of his lips & face. First noticed the night prior to presentation, initially on the L side of upper lip but progressively spread to both lips. By the morning the swelling had spread to the whole face with difficulty breathing thus came in to the ED for evaluation. He denied any history of allergies or any similar previous reaction. No family history of allergies or similar reactions. He has been for Lisinopril for ~5 years with mild tongue swelling in the past.     In ER: initial vitals: T 98.2, HR92, /110, RR 20, pO2 100.  Was given epinephrine injection, IV solumedrol, benadryl, Pepcid and admitted to the CCU for further stabilization.         Assessment & Plan:        1. Angioedema: Resolved.    Likely secondary to ace-inhibitor use.    Advised to stop taking ace-inhibitor.    Lisinopril discontinued.            2. HTN:    Started on Amlodipine.    Follow up out patient with PMD.            3. Alcohol use disorder:    No evidence of withdrawal.    Counselling provided.            4. Suspected Thiamine and folate deficiency:    Continued supplements.                 5. Magnesium Deficiency:    Replaced.            6. ADHD:    Continued Adderall.

## 2019-09-17 DIAGNOSIS — Z85.51 PERSONAL HISTORY OF MALIGNANT NEOPLASM OF BLADDER: ICD-10-CM

## 2019-09-17 DIAGNOSIS — E83.42 HYPOMAGNESEMIA: ICD-10-CM

## 2019-09-17 DIAGNOSIS — E61.2 MAGNESIUM DEFICIENCY: ICD-10-CM

## 2019-09-17 DIAGNOSIS — E51.9 THIAMINE DEFICIENCY, UNSPECIFIED: ICD-10-CM

## 2019-09-17 DIAGNOSIS — F90.9 ATTENTION-DEFICIT HYPERACTIVITY DISORDER, UNSPECIFIED TYPE: ICD-10-CM

## 2019-09-17 DIAGNOSIS — F10.99 ALCOHOL USE, UNSPECIFIED WITH UNSPECIFIED ALCOHOL-INDUCED DISORDER: ICD-10-CM

## 2019-09-17 DIAGNOSIS — T46.4X5A ADVERSE EFFECT OF ANGIOTENSIN-CONVERTING-ENZYME INHIBITORS, INITIAL ENCOUNTER: ICD-10-CM

## 2019-09-17 DIAGNOSIS — I10 ESSENTIAL (PRIMARY) HYPERTENSION: ICD-10-CM

## 2019-09-17 DIAGNOSIS — T78.3XXA ANGIONEUROTIC EDEMA, INITIAL ENCOUNTER: ICD-10-CM

## 2019-09-17 DIAGNOSIS — E53.8 DEFICIENCY OF OTHER SPECIFIED B GROUP VITAMINS: ICD-10-CM

## 2024-05-13 NOTE — CONSULT NOTE ADULT - ASSESSMENT
Spoke to imaging, she stated I would have to call back in an hour  
IMPRESSION:  allergy / anaphylaxis upper airway compromising ,   alcohol abuse     PLAN:    CNS: no sedation , thiamine , folate   ativan if any withdrawal     HEENT: oral care     PULMONARY: oral care     CARDIOVASCULAR: d/c iv fluid , off ACE   start amlodipine for high BP follow PMD     GI: GI prophylaxis. start  Feeding     RENAL: follow lytes     INFECTIOUS DISEASE: no abx     HEMATOLOGICAL:  DVT prophylaxis.    ENDOCRINE:  Follow up FS.  Insulin protocol if needed.    MUSCULOSKELETAL:    detoxx consult   transfer to floor no sign of withdrawal     CRITICAL CARE TIME SPENT: ***
